# Patient Record
Sex: FEMALE | Race: WHITE
[De-identification: names, ages, dates, MRNs, and addresses within clinical notes are randomized per-mention and may not be internally consistent; named-entity substitution may affect disease eponyms.]

---

## 2017-01-09 ENCOUNTER — HOSPITAL ENCOUNTER (EMERGENCY)
Dept: HOSPITAL 25 - UCEAST | Age: 27
Discharge: HOME | End: 2017-01-09
Payer: COMMERCIAL

## 2017-01-09 VITALS — SYSTOLIC BLOOD PRESSURE: 116 MMHG | DIASTOLIC BLOOD PRESSURE: 65 MMHG

## 2017-01-09 DIAGNOSIS — Z88.0: ICD-10-CM

## 2017-01-09 DIAGNOSIS — J01.90: Primary | ICD-10-CM

## 2017-01-09 DIAGNOSIS — B96.89: ICD-10-CM

## 2017-01-09 PROCEDURE — 99202 OFFICE O/P NEW SF 15 MIN: CPT

## 2017-01-09 PROCEDURE — 87651 STREP A DNA AMP PROBE: CPT

## 2017-01-09 PROCEDURE — G0463 HOSPITAL OUTPT CLINIC VISIT: HCPCS

## 2017-01-09 NOTE — UC
Perlita GONZALEZ Matthew, scribed for Dona Copeland DO on 01/09/17 at 1025 .





Throat Pain/Nasal Kapil HPI





- HPI Summary


HPI Summary: 


A 25 y/o female presents to [facility] with constant, gradually worsening sore throat 

since 4 days ago. The pain is rated 5/10 in severity. Associated symptoms 

include intermittent sinus headache, ear ache - comes and goes bilaterally, 

sinus congestion, very mild cough, and rhinorrhea. The patient denies SOB, 

difficulty breathing, neck pain, nausea, vomiting, dysuria, abdominal pain, 

body aches, chest pain, and fever. The patient currently works at a day care 

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 











- History of Current Complaint


Chief Complaint: UCRespiratory


Stated Complaint: SINUS CONGESTION, AND EAR ACHE


Time Seen by Provider: 01/09/17 09:56


Hx Obtained From: Patient


Hx Last Menstrual Period: 12/20/16


Onset/Duration: Gradual Onset, Lasting Days - since 4 days ago, Still Present


Severity: Moderate


Pain Intensity: 5


Pain Scale Used: 0-10 Numeric


Cough: Other: - mild


Associated Signs & Symptoms: Positive: Dysphagia, Sinus Discomfort, Nasal 

Discharge, Other - ear ache, intermittent sinus headache, rhinorrhea.  Negative

: Drooling, Wheezing, Hoarseness, Fever, Vomiting





- Epiglottits Risk Factors


Epiglottis Risk Factors: Negative





- Allergies/Home Medications


Allergies/Adverse Reactions: 


 Allergies











Allergy/AdvReac Type Severity Reaction Status Date / Time


 


Penicillins Allergy  Hives Verified 01/09/17 10:11











Home Medications: 


 Home Medications





Bupropion HCl [Bupropion HCl ER]  01/09/17 [History]


Sertraline HCl [Zoloft] 100 mg PO 01/09/17 [History]











PMH/Surg Hx/FS Hx/Imm Hx


Previously Healthy: Yes


Respiratory History Of: 


   Denies: Asthma


Psychological History Of: Reports: Anxiety





- Surgical History


Surgical History: None





- Family History


Known Family History: 


   Negative: Cardiac Disease, Hypertension, Diabetes





- Social History


Occupation: Employed Full-time - Day Care


Alcohol Use: Occasionally


Substance Use Type: None


Smoking Status (MU): Never Smoked Tobacco





Review of Systems


Constitutional: Negative


Skin: Negative


Eyes: Negative


ENT: Sore Throat, Ear Ache, Nasal Discharge, Other - Sinus Congestion


Respiratory: Negative


Cardiovascular: Negative


Gastrointestinal: Negative


Genitourinary: Negative


Motor: Negative


Neurovascular: Negative


Musculoskeletal: Negative


Neurological: Headache - Sinus headache


Psychological: Negative


All Other Systems Reviewed And Are Negative: Yes





Physical Exam


Triage Information Reviewed: Yes


Appearance: Well-Appearing, No Pain Distress, Well-Nourished


Vital Signs: 


 Initial Vital Signs











Temp  98.1 F   01/09/17 10:07


 


Pulse  70   01/09/17 10:07


 


Resp  18   01/09/17 10:07


 


BP  116/65   01/09/17 10:07


 


Pulse Ox  98   01/09/17 10:07











Vital Signs Reviewed: Yes


Eyes: Positive: Conjunctiva Clear.  Negative: Discharge


ENT: Positive: Hearing grossly normal, Pharyngeal erythema, Nasal congestion, 

Nasal drainage, TMs normal, Tonsillar swelling.  Negative: Tonsillar exudate, 

Trismus, Muffled/hoarse voice


Dental Exam: Normal


Neck: Positive: Supple, Nontender


Respiratory: Positive: Lungs clear, Normal breath sounds, No respiratory 

distress, No accessory muscle use


Cardiovascular: Positive: RRR, No Murmur


Musculoskeletal Exam: Normal


Neurological: Positive: Alert, Muscle Tone Normal


Psychological Exam: Normal


Psychological: Positive: Age Appropriate Behavior


Skin Exam: Normal


Skin: Positive: Other - warm, dry, normal color





Throat Pain/Nasal Course/Dx





- Differential Dx/Diagnosis


Differential Diagnosis/HQI/PQRI: Pharyngitis, Sinusitis, Tonsillitis


Provider Diagnoses: SINUSITIS





Discharge





- Discharge Plan


Condition: Stable


Disposition: HOME


Prescriptions: 


Azithromycin TAB* [Zithromax TAB (Z-SHAWN)*] 0 mg PO .SEE INSTRUCTIONS #6 tab


Patient Education Materials:  Sinusitis (ED)


Referrals: 


Geo Baxter MD [Medical Doctor] - 


Additional Instructions: 


TRY USING THE NETTI POT IN THE MORNINGS AS DISCUSSED.  YOU MUST ALWAYS USE 

CLEAN WATER.





REMEMBER, POSTURE IS AN IMPORTANT FACTOR IN SINUS DRAINAGE.  MOVE YOUR NECK, 

BREATHE.





ANTIBIOTICS ARE NOT CURRENTLY INDICATED FOR YOUR CONDITION.  HOWEVER IF YOUR 

SYMPTOMS WORSEN OR PERSIST FOR MORE THAN 3-5 DAYS, YOU CAN START THE FOLLOWING 

ANTIBIOTICS.





AZITHROMYCIN:


     Azithromycin (Zithromax) is a broad spectrum antibiotic in the same class 

as erythromycin.  It can treat a variety of bacterial infections, but is most 

frequently used for respiratory infections.


     Azithromycin is extremely long-lasting.  It accumulates in body tissues 

and continues to kill bacteria for many days.


     In order to improve absorption, Azithromycin should be taken at least one 

hour before or two hours after a meal.  It does not have the same strong 

tendency to upset the stomach as erythromycin and is usually very well 

tolerated.


     Patients who have had a rash or other true allergic reactions to 

erythromycin should not take this medication.  Call if you develop 

gastrointestinal distress, severe diarrhea, rash, hives, itching, or shortness 

of breath.





ANY TIME YOU TAKE AN ANTIBIOTIC, IT IS IMPORTANT TO REPLENISH THE BODY'S 

BALANCE OF "GOOD" BACTERIA BY EATING HIGH QUALITY CULTURED FOOD SUCH AS YOGURT, 

SAURKRAUT OR MAHESH CHI AND/OR TAKING A PROBIOTIC SUPPLEMENT.





The documentation as recorded by the Perlita og Matthew accurately 

reflects the service I personally performed and the decisions made by me, Dona Copeland DO.

## 2017-11-27 ENCOUNTER — HOSPITAL ENCOUNTER (EMERGENCY)
Dept: HOSPITAL 25 - UCEAST | Age: 27
Discharge: HOME | End: 2017-11-27
Payer: COMMERCIAL

## 2017-11-27 VITALS — DIASTOLIC BLOOD PRESSURE: 76 MMHG | SYSTOLIC BLOOD PRESSURE: 122 MMHG

## 2017-11-27 DIAGNOSIS — J02.0: ICD-10-CM

## 2017-11-27 DIAGNOSIS — F41.9: ICD-10-CM

## 2017-11-27 DIAGNOSIS — Z88.0: ICD-10-CM

## 2017-11-27 DIAGNOSIS — O26.891: Primary | ICD-10-CM

## 2017-11-27 DIAGNOSIS — Z3A.13: ICD-10-CM

## 2017-11-27 PROCEDURE — 87651 STREP A DNA AMP PROBE: CPT

## 2017-11-27 PROCEDURE — 99212 OFFICE O/P EST SF 10 MIN: CPT

## 2017-11-27 PROCEDURE — 87070 CULTURE OTHR SPECIMN AEROBIC: CPT

## 2017-11-27 PROCEDURE — G0463 HOSPITAL OUTPT CLINIC VISIT: HCPCS

## 2017-11-29 NOTE — UC
Progress





- Progress Note


Progress Note: 





Throat culture with normal olvin only - no strep. See how rash is today - if 

still present, needs f/u with PCP and/or OBGYN.

## 2017-12-11 NOTE — UC
Evelio GONZALEZ Alfonso, scribed for Dona Copeland DO on 11/27/17 at 0749 .





 General HPI





- HPI Summary


HPI Summary: 


 This patient is a 27 year old F presenting to [facility] with a chief complaint of 

a head cold since 1 week ago. The patient rates the pain 6/10 in severity. 

Symptoms aggravated by nothing. Symptoms alleviated by rest. Patient reports 

sinus congestion, rhinorrhea, sore throat (since 3 days ago, worse since last 

night, pain with swallowing and eating), bumps on my face, pruritus (back of 

neck and shoulders), diarrhea (2 times last night and not today), sinus 

headache (yesterday morning-now resolved), nonproductive coughing, and hot 

flashes. Patient denies fever, chills, vag bleeding, visual changes, 

contractions, swelling of hands and feet, abdominal pain, N/V, CP, SOB, weakness

, dizziness, swelling, and urinary symptoms. She is currently 13 weeks 

pregnant. She denies new foods, detergents, soaps, and pet contacts.





- History of Current Complaint


Chief Complaint: UCGeneralIllness


Stated Complaint: SORE THROAT, CONGESTED, RASH


Time Seen by Provider: 11/27/17 07:36


Hx Obtained From: Patient


Onset/Duration: Gradual Onset, Lasting Weeks - 1, Still Present


Timing: Constant


Current Severity: Moderate


Pain Intensity: 6 - /10


Aggravating: nothing


Alleviating: rest


Associated Signs & Symptoms: Positive: Other - sinus congestion, rhinorrhea, 

sore throat (since 3 days ago, worse since last night, pain with swallowing and 

eating), bumps on my face, pruritus (back of neck and shoulders), diarrhea (2 

times last night and not today), headache (yesterday morningsinus- now resolved)

, nonproductive coughing, and hot flashes. Patient denies fever, chills, blood 

in the stools, abdominal bloating, abdominal pain, N/V, CP, SOB, weakness, 

dizziness, swelling, and urinary symptoms.





- Allergy/Home Medications


Allergies/Adverse Reactions: 


 Allergies











Allergy/AdvReac Type Severity Reaction Status Date / Time


 


Penicillins Allergy  Hives Verified 11/27/17 07:27











Home Medications: 


 Home Medications





Prenatal Multivitamins &  [Prenatal Gummies/Dha & Fo 0.4-32.5 mg] 1 tab PO 

DAILY 11/27/17 [History Confirmed 11/27/17]











PMH/Surg Hx/FS Hx/Imm Hx


Previously Healthy: No


Psychological History: Anxiety





- Surgical History


Surgical History: Yes


Surgery Procedure, Year, and Place: [place] Teeth extracton 2007; Bilateral 

Tubes x 2 as child





- Family History


Known Family History: Positive: Hypertension - Mother


   Negative: Cardiac Disease, Diabetes





- Social History


Lives: With Family


Alcohol Use: None


Substance Use Type: None


Smoking Status (MU): Never Smoked Tobacco





- Immunization History


Most Recent Influenza Vaccination: Not UTD





Review of Systems


Constitutional: Other - "a head cold," hot flashes; negative fever, chills


Skin: Other - bumps on my face, pruritus (back of neck and shoulders)


ENT: Other - sinus congestion, rhinorrhea, sore throat (since 3 days ago, worse 

since last night, pain with swallowing and eating)


Respiratory: Cough, Other - Negative SOB


Cardiovascular: Other - Negative CP


Gastrointestinal: Diarrhea, Other - Negative blood in the stools, abd bloating, 

abd pain, N/V


Genitourinary: Negative


Musculoskeletal: Other: - Negative swelling


Neurological: Headache, Other - Negative weakness, dizziness


All Other Systems Reviewed And Are Negative: Yes





Physical Exam


Triage Information Reviewed: Yes


Appearance: Well-Appearing, No Pain Distress, Well-Nourished


Vital Signs: 


 Initial Vital Signs











Temp  99.5 F   11/27/17 07:20


 


Pulse  96   11/27/17 07:20


 


Resp  18   11/27/17 07:20


 


BP  134/78   11/27/17 07:20


 


Pulse Ox  99   11/27/17 07:20











Vital Signs Reviewed: Yes


Eyes: Positive: Conjunctiva Clear.  Negative: Discharge


ENT: Positive: Hearing grossly normal, Pharyngeal erythema, Tonsillar swelling 

- mild.  Negative: Tonsillar exudate, Trismus, Hoarse voice


Neck exam: Normal


Neck: Positive: Supple


Respiratory: Positive: Lungs clear, Normal breath sounds, No respiratory 

distress, No accessory muscle use


Cardiovascular: Positive: RRR, No Murmur


Abdomen Description: Positive: Nontender, Soft.  Negative: Distended, Guarding


Bowel Sounds: Positive: Present


Musculoskeletal Exam: Normal


Neurological: Positive: Alert, Muscle Tone Normal


Psychological Exam: Normal


Psychological: Positive: Age Appropriate Behavior


Skin: Positive: Other - Warm. Dry. Faint erythematous sandpapery rash at face 

and knee.





Course/Dx





- Course


Course Of Treatment: Two group A rapid strep tests are both negative. 

Medications reviewed. Allergies reviewed. Consulted Dr. Arzate (OBGYN) at 0837 

regarding the patients case and plan for care. He does not feel this is 

pregnancy related, but states that if the case looks like strep and we are not 

entirely trusting of the rapid strep tests, we should treat the patient with 

abx and send for cultures. Patient will be discharged with prescription for Z-

[place], and follow up from PCP and OBGYN. The patient is agreeable with this plan.





- Differential Dx - Multi-Symptom


Provider Diagnoses: strep throat





- Physician Notifications


Discussed Patient Care With: Shahbaz Arzate


Time Discussed With Above Provider: 08:37





Discharge





- Discharge Plan


Condition: Stable


Disposition: HOME


Prescriptions: 


Azithromycin TAB* [Zithromax TAB (Z-KENY) 250 mg #6 tabs] 250 mg PO DAILY #4 tab


Patient Education Materials:  Strep Throat (ED)


Forms:  *Work Release


Referrals: 


Jessica Marquez MD [Medical Doctor] - 1 Week


Geo Baxter MD [Primary Care Provider] - 2 Days


Additional Instructions: 


YOUR RAPID STREP TEST WAS NEGATIVE TWO TIMES.  STILL YOUR RASH LOOKS LIKE  

STREP.  SO WE ARE SENDING IN A CULTURE TO CONFIRM.  WE DID SPEAK WITH THE ON 

CALL FOR YOUR OB WHO SAID THAT YOUR SYMPTOMS DO NOT APPEAR TO BE PREGNANCY 

RELATED.  HOWEVER, IF THE CLINICAL PICTURE IS HIGHLY SUSPICIOUS FOR STREP, WE 

CAN TREAT YOU MORE PROACTIVELY BECAUSE YOU ARE PREGNANT.  SO WE WILL START 

TREATMENT WHILE WE WAIT FOR CULTURE RESULTS.





AZITHROMYCIN:


     Azithromycin (Zithromax) is a broad spectrum antibiotic in the same class 

as erythromycin.  It can treat a variety of bacterial infections, but is most 

frequently used for respiratory infections.


     Azithromycin is extremely long-lasting.  It accumulates in body tissues 

and continues to kill bacteria for many days.


     In order to improve absorption, Azithromycin should be taken at least one 

hour before or two hours after a meal.  It does not have the same strong 

tendency to upset the stomach as erythromycin and is usually very well 

tolerated.


     Patients who have had a rash or other true allergic reactions to 

erythromycin should not take this medication.  Call if you develop 

gastrointestinal distress, severe diarrhea, rash, hives, itching, or shortness 

of breath.





ANYTIME YOU TAKE AN ANTIBIOTIC, IT IS IMPORTANT TO REPLENISH THE BODY'S SUPPLY 

OF "GOOD BACTERIA."  YOU CAN GET GOOD BACTERIA FROM HIGH QUALITY CULTURED FOODS 

SUCH AS LOCAL YOGURT, SOUR KRAUT, MAHESH GIUSEPPE, NATURALLY FERMENTED PICKLES AND 

PROBIOTIC DRINKS.  YOU CAN ALSO GET GOOD BACTERIA FROM A PROBIOTIC SUPPLEMENT. 





The documentation as recorded by the Evelio og Alfonso accurately reflects 

the service I personally performed and the decisions made by me, Dona Copeland DO.

## 2018-05-08 ENCOUNTER — HOSPITAL ENCOUNTER (INPATIENT)
Dept: HOSPITAL 25 - MCHOBOUT | Age: 28
LOS: 4 days | Discharge: HOME | DRG: 560 | End: 2018-05-12
Attending: OBSTETRICS & GYNECOLOGY | Admitting: OBSTETRICS & GYNECOLOGY
Payer: COMMERCIAL

## 2018-05-08 DIAGNOSIS — O41.8X30: ICD-10-CM

## 2018-05-08 DIAGNOSIS — Z3A.36: ICD-10-CM

## 2018-05-08 PROCEDURE — 80053 COMPREHEN METABOLIC PANEL: CPT

## 2018-05-08 PROCEDURE — 86850 RBC ANTIBODY SCREEN: CPT

## 2018-05-08 PROCEDURE — 86900 BLOOD TYPING SEROLOGIC ABO: CPT

## 2018-05-08 PROCEDURE — 86901 BLOOD TYPING SEROLOGIC RH(D): CPT

## 2018-05-08 PROCEDURE — 36415 COLL VENOUS BLD VENIPUNCTURE: CPT

## 2018-05-08 PROCEDURE — 85025 COMPLETE CBC W/AUTO DIFF WBC: CPT

## 2018-05-08 PROCEDURE — 59200 INSERT CERVICAL DILATOR: CPT

## 2018-05-08 NOTE — PN
L&D Outpatient: Visit





- Reproductive Information


Estimated Due Date: 18


Gestational Age: 36 Weeks and 2 Days


: 2


Para: 0





- Reason for Visit


Visit Reason: 





Patient with an IUP at 36+ weeks EGA, sent to LD for cervical ripening in 

preparation for induction of labor.





- Antepartal Records


Antepartal Record: Reviewed, Pregnancy Complicated by: - Pre-eclampsia





- Patient History


Patient History Significant For: 





Depression /Anxiety


Seasonal Allergies





L&D Outpatient: ROS





- Review of Systems


Constitutional: Comfortable


CV Complaint: No


Respiratory: Shortness of Breath: No


Gastrointestinal: No Nausea/Vomiting, Normal Bowel Movement


Genitourinary: No Dysuria, No Bleeding, No Leaking Fluid


Musculoskeletal: No Complaint


Fetal Movement: Normal - No headaches, visual changes or abdominal pain.





L&D Outpatient: Exam


Vitals - Most Recent: 





Weigyht 215 lbs


Height 5'4"


Temp 99.1


/82


P 81


RR 22 


Pox 96% RA





- Cervical Exam


Cervical Exam: 





Fingertip/70-80% effaced/Cephalic at minus 2-3 station.





- Abdominal Exam


Abdomen Exam: Non-Tender, Fundal Height Consistent with Dates





- Membranes


Membrane Status: Intact





- Ultrasound/Biophysical Profile


Ultrasound Status: Not Done


Biophysical Profile: Normal Reactive NST





L&D Outpatient: EFM





- External Fetal Monitor Findings


Baseline Fetal Heart Rate: 140


External Fetal Monitor Findings: Accelerations Present





L&D Outpatient: Asses/Plan


Assessment: 





Pregnancy at 36 weeks with pre-eclampsia her for cervical ripening.





- Discharge Diagnosis


Discharge Diagnosis: PreEclampsia - Cervidil cervical ripening and observe.

## 2018-05-09 LAB
BASOPHILS # BLD AUTO: 0.1 10^3/UL (ref 0–0.2)
EOSINOPHIL # BLD AUTO: 0 10^3/UL (ref 0–0.6)
HCT VFR BLD AUTO: 40 % (ref 35–47)
HGB BLD-MCNC: 13.5 G/DL (ref 12–16)
LYMPHOCYTES # BLD AUTO: 1.6 10^3/UL (ref 1–4.8)
MCH RBC QN AUTO: 30 PG (ref 27–31)
MCHC RBC AUTO-ENTMCNC: 34 G/DL (ref 31–36)
MCV RBC AUTO: 88 FL (ref 80–97)
MONOCYTES # BLD AUTO: 1.1 10^3/UL (ref 0–0.8)
NEUTROPHILS # BLD AUTO: 14.4 10^3/UL (ref 1.5–7.7)
NRBC # BLD AUTO: 0 10^3/UL
NRBC BLD QL AUTO: 0.1
PLATELET # BLD AUTO: 231 10^3/UL (ref 150–450)
RBC # BLD AUTO: 4.54 10^6/UL (ref 4–5.4)
WBC # BLD AUTO: 17.2 10^3/UL (ref 3.5–10.8)

## 2018-05-09 PROCEDURE — 3E033VJ INTRODUCTION OF OTHER HORMONE INTO PERIPHERAL VEIN, PERCUTANEOUS APPROACH: ICD-10-PCS | Performed by: OBSTETRICS & GYNECOLOGY

## 2018-05-09 PROCEDURE — 3E0P7VZ INTRODUCTION OF HORMONE INTO FEMALE REPRODUCTIVE, VIA NATURAL OR ARTIFICIAL OPENING: ICD-10-PCS | Performed by: OBSTETRICS & GYNECOLOGY

## 2018-05-09 PROCEDURE — 10907ZC DRAINAGE OF AMNIOTIC FLUID, THERAPEUTIC FROM PRODUCTS OF CONCEPTION, VIA NATURAL OR ARTIFICIAL OPENING: ICD-10-PCS | Performed by: OBSTETRICS & GYNECOLOGY

## 2018-05-09 PROCEDURE — 4A1HXCZ MONITORING OF PRODUCTS OF CONCEPTION, CARDIAC RATE, EXTERNAL APPROACH: ICD-10-PCS | Performed by: OBSTETRICS & GYNECOLOGY

## 2018-05-09 NOTE — HP
General Information





- General Information


Maternal Age: 27


Grav: 2


Para: 0


SAB: 1


IEA: 0





Estimated Due Date: 18


Determined By: Early Ultrasound


Gestational Age in Weeks and Days: 36 Weeks and 2 Days


Maternal Blood Type and Rh: O Positive





- Results this Pregnancy


Serology/RPR Result: Non-Reactive


Rubella Result: Immune


HBsAg Result: Negative


HIV Result: Negative


GBS Culture Result: Negative





Past Medical History


Delivery History: See  Records


Pertinent Past Medical History: Non-Contributory


Pertinent Past Surgical History: None


Pertinent Family History: Non-Contributory





- Antepartal Records


Antepartal Records: Reviewed, Pregnancy Complicated by: - preeclampsia





Review of Systems


Constitutional: Comfortable


CV Complaint: No


Respiratory: Shortness of Breath: No


Gastrointestinal: No Nausea/Vomiting


Genitourinary: No Bleeding, No Leaking Fluid


Fetal Movement: Normal





- Comments





pt has had a persistent headache. not severe





Exam


Allergies/Adverse Reactions: 


Allergies





Penicillins Allergy (Intermediate, Verified 18 06:14)


 Hives











- Measurements


Height: 5 ft 4 in


Weight: 215 lb


Weight in lbs: 215


Body Mass Index (BMI): 36.8


Pre-Pregnancy Weight: 153 lb


Weight Gained This Pregnancy: 62 lbs and 0 ozs





- Exam


Abdomen: No Upper Quadrant Pain


CVA: No CVA Tenderness


Extremities: Edema - 3+


Heart: Normal Rhythm/Heart Sounds


HEENT: No Significant Findings


Lungs: Clear Bilaterally





Targeted Exam Findings


See L&D Outpatient Visit Provider Note for Findings: Yes


Presenting Part: Vertex


Membrane Status: Intact





EFM Findings





- External Fetal Monitor Findings


Baseline Fetal Heart Rate: 135


External Fetal Monitor Findings: Accelerations Present, Variability Moderate


Contractions: Regular





Assessment/Plan





- Reason for Visit


Reason for Visit: 





preeclampsia . mild with headache. received steroids over weekend . plane was 

to induce on  at 37 weeks but with persistent headache concern over 

devloping more severe features. lab work is negative for LFT or platelet changes





- Obstetrical Risk Factors


Obstetrical Risk Factors: Proteinuria, PreEclampsia





- Plan


Plan: Induction

## 2018-05-10 PROCEDURE — 0KQM0ZZ REPAIR PERINEUM MUSCLE, OPEN APPROACH: ICD-10-PCS | Performed by: OBSTETRICS & GYNECOLOGY

## 2018-05-10 RX ADMIN — WITCH HAZEL PRN PAD: 5 CLOTH TOPICAL at 01:54

## 2018-05-10 RX ADMIN — WITCH HAZEL PRN PAD: 5 CLOTH TOPICAL at 06:43

## 2018-05-10 RX ADMIN — DOCUSATE SODIUM SCH MG: 100 CAPSULE, LIQUID FILLED ORAL at 20:54

## 2018-05-10 RX ADMIN — DOCUSATE SODIUM SCH MG: 100 CAPSULE, LIQUID FILLED ORAL at 15:04

## 2018-05-10 RX ADMIN — IBUPROFEN PRN MG: 600 TABLET, FILM COATED ORAL at 01:53

## 2018-05-10 RX ADMIN — DIBUCAINE PRN APPLIC: 1 OINTMENT TOPICAL at 01:54

## 2018-05-10 RX ADMIN — DIBUCAINE PRN APPLIC: 1 OINTMENT TOPICAL at 06:43

## 2018-05-10 RX ADMIN — DOCUSATE SODIUM SCH MG: 100 CAPSULE, LIQUID FILLED ORAL at 08:51

## 2018-05-10 RX ADMIN — IBUPROFEN PRN MG: 600 TABLET, FILM COATED ORAL at 20:55

## 2018-05-11 LAB
BASOPHILS # BLD AUTO: 0 10^3/UL (ref 0–0.2)
EOSINOPHIL # BLD AUTO: 0.1 10^3/UL (ref 0–0.6)
HCT VFR BLD AUTO: 28 % (ref 35–47)
HGB BLD-MCNC: 9.7 G/DL (ref 12–16)
LYMPHOCYTES # BLD AUTO: 2.5 10^3/UL (ref 1–4.8)
MCH RBC QN AUTO: 31 PG (ref 27–31)
MCHC RBC AUTO-ENTMCNC: 35 G/DL (ref 31–36)
MCV RBC AUTO: 89 FL (ref 80–97)
MONOCYTES # BLD AUTO: 0.6 10^3/UL (ref 0–0.8)
NEUTROPHILS # BLD AUTO: 7 10^3/UL (ref 1.5–7.7)
NRBC # BLD AUTO: 0 10^3/UL
NRBC BLD QL AUTO: 0
PLATELET # BLD AUTO: 175 10^3/UL (ref 150–450)
RBC # BLD AUTO: 3.13 10^6/UL (ref 4–5.4)
WBC # BLD AUTO: 10.3 10^3/UL (ref 3.5–10.8)

## 2018-05-11 RX ADMIN — ACETAMINOPHEN PRN MG: 325 TABLET ORAL at 09:46

## 2018-05-11 RX ADMIN — DOCUSATE SODIUM SCH MG: 100 CAPSULE, LIQUID FILLED ORAL at 09:46

## 2018-05-11 RX ADMIN — Medication SCH MG: at 20:23

## 2018-05-11 RX ADMIN — ACETAMINOPHEN PRN MG: 325 TABLET ORAL at 20:23

## 2018-05-11 RX ADMIN — DIBUCAINE PRN APPLIC: 1 OINTMENT TOPICAL at 09:46

## 2018-05-11 RX ADMIN — IBUPROFEN PRN MG: 600 TABLET, FILM COATED ORAL at 11:20

## 2018-05-11 RX ADMIN — ACETAMINOPHEN PRN MG: 325 TABLET ORAL at 14:42

## 2018-05-11 RX ADMIN — DOCUSATE SODIUM SCH MG: 100 CAPSULE, LIQUID FILLED ORAL at 14:42

## 2018-05-11 RX ADMIN — WITCH HAZEL PRN PAD: 5 CLOTH TOPICAL at 17:48

## 2018-05-11 RX ADMIN — WITCH HAZEL PRN PAD: 5 CLOTH TOPICAL at 04:44

## 2018-05-11 RX ADMIN — Medication SCH MG: at 09:46

## 2018-05-11 RX ADMIN — IBUPROFEN PRN MG: 600 TABLET, FILM COATED ORAL at 20:23

## 2018-05-11 RX ADMIN — IBUPROFEN PRN MG: 600 TABLET, FILM COATED ORAL at 04:44

## 2018-05-12 VITALS — DIASTOLIC BLOOD PRESSURE: 86 MMHG | SYSTOLIC BLOOD PRESSURE: 141 MMHG

## 2018-05-12 RX ADMIN — DOCUSATE SODIUM SCH MG: 100 CAPSULE, LIQUID FILLED ORAL at 19:18

## 2018-05-12 RX ADMIN — Medication SCH MG: at 08:14

## 2018-05-12 RX ADMIN — IBUPROFEN PRN MG: 600 TABLET, FILM COATED ORAL at 15:34

## 2018-05-12 RX ADMIN — ACETAMINOPHEN PRN MG: 325 TABLET ORAL at 15:34

## 2018-05-12 RX ADMIN — Medication SCH MG: at 19:18

## 2018-05-12 RX ADMIN — DIBUCAINE PRN APPLIC: 1 OINTMENT TOPICAL at 08:12

## 2018-05-12 RX ADMIN — DOCUSATE SODIUM SCH MG: 100 CAPSULE, LIQUID FILLED ORAL at 15:35

## 2018-05-12 RX ADMIN — DOCUSATE SODIUM SCH MG: 100 CAPSULE, LIQUID FILLED ORAL at 08:13

## 2018-05-12 RX ADMIN — WITCH HAZEL PRN PAD: 5 CLOTH TOPICAL at 08:12

## 2018-05-12 RX ADMIN — IBUPROFEN PRN MG: 600 TABLET, FILM COATED ORAL at 08:13

## 2018-05-12 RX ADMIN — ACETAMINOPHEN PRN MG: 325 TABLET ORAL at 08:12

## 2018-05-12 RX ADMIN — WITCH HAZEL PRN PAD: 5 CLOTH TOPICAL at 17:19
